# Patient Record
Sex: MALE | Race: WHITE | NOT HISPANIC OR LATINO | Employment: OTHER | URBAN - METROPOLITAN AREA
[De-identification: names, ages, dates, MRNs, and addresses within clinical notes are randomized per-mention and may not be internally consistent; named-entity substitution may affect disease eponyms.]

---

## 2017-02-20 ENCOUNTER — ALLSCRIPTS OFFICE VISIT (OUTPATIENT)
Dept: OTHER | Facility: OTHER | Age: 62
End: 2017-02-20

## 2017-02-20 DIAGNOSIS — I10 ESSENTIAL (PRIMARY) HYPERTENSION: ICD-10-CM

## 2017-02-20 DIAGNOSIS — Z11.59 ENCOUNTER FOR SCREENING FOR OTHER VIRAL DISEASES: ICD-10-CM

## 2017-02-20 DIAGNOSIS — Z13.220 ENCOUNTER FOR SCREENING FOR LIPOID DISORDERS: ICD-10-CM

## 2017-02-20 DIAGNOSIS — N40.0 ENLARGED PROSTATE WITHOUT LOWER URINARY TRACT SYMPTOMS (LUTS): ICD-10-CM

## 2017-02-20 DIAGNOSIS — R73.01 IMPAIRED FASTING GLUCOSE: ICD-10-CM

## 2017-02-20 DIAGNOSIS — E78.5 HYPERLIPIDEMIA: ICD-10-CM

## 2017-02-21 ENCOUNTER — APPOINTMENT (OUTPATIENT)
Dept: LAB | Facility: HOSPITAL | Age: 62
End: 2017-02-21
Payer: COMMERCIAL

## 2017-02-21 DIAGNOSIS — I10 ESSENTIAL (PRIMARY) HYPERTENSION: ICD-10-CM

## 2017-02-21 DIAGNOSIS — Z11.59 ENCOUNTER FOR SCREENING FOR OTHER VIRAL DISEASES: ICD-10-CM

## 2017-02-21 DIAGNOSIS — R73.01 IMPAIRED FASTING GLUCOSE: ICD-10-CM

## 2017-02-21 DIAGNOSIS — N40.0 ENLARGED PROSTATE WITHOUT LOWER URINARY TRACT SYMPTOMS (LUTS): ICD-10-CM

## 2017-02-21 DIAGNOSIS — Z13.220 ENCOUNTER FOR SCREENING FOR LIPOID DISORDERS: ICD-10-CM

## 2017-02-21 LAB
ALBUMIN SERPL BCP-MCNC: 3.8 G/DL (ref 3.5–5)
ALP SERPL-CCNC: 53 U/L (ref 46–116)
ALT SERPL W P-5'-P-CCNC: 25 U/L (ref 12–78)
ANION GAP SERPL CALCULATED.3IONS-SCNC: 6 MMOL/L (ref 4–13)
AST SERPL W P-5'-P-CCNC: 10 U/L (ref 5–45)
BILIRUB SERPL-MCNC: 0.42 MG/DL (ref 0.2–1)
BILIRUB UR QL STRIP: NEGATIVE
BUN SERPL-MCNC: 21 MG/DL (ref 5–25)
CALCIUM SERPL-MCNC: 8.7 MG/DL (ref 8.3–10.1)
CHLORIDE SERPL-SCNC: 110 MMOL/L (ref 100–108)
CHOLEST SERPL-MCNC: 182 MG/DL (ref 50–200)
CLARITY UR: CLEAR
CO2 SERPL-SCNC: 26 MMOL/L (ref 21–32)
COLOR UR: YELLOW
CREAT SERPL-MCNC: 0.88 MG/DL (ref 0.6–1.3)
ERYTHROCYTE [DISTWIDTH] IN BLOOD BY AUTOMATED COUNT: 13.2 % (ref 11.6–15.1)
EST. AVERAGE GLUCOSE BLD GHB EST-MCNC: 111 MG/DL
GFR SERPL CREATININE-BSD FRML MDRD: >60 ML/MIN/1.73SQ M
GLUCOSE SERPL-MCNC: 106 MG/DL (ref 65–140)
GLUCOSE UR STRIP-MCNC: NEGATIVE MG/DL
HBA1C MFR BLD: 5.5 % (ref 4.2–6.3)
HCT VFR BLD AUTO: 48.3 % (ref 36.5–49.3)
HDLC SERPL-MCNC: 40 MG/DL (ref 40–60)
HGB BLD-MCNC: 16.7 G/DL (ref 12–17)
HGB UR QL STRIP.AUTO: NEGATIVE
KETONES UR STRIP-MCNC: NEGATIVE MG/DL
LDLC SERPL CALC-MCNC: 123 MG/DL (ref 0–100)
LEUKOCYTE ESTERASE UR QL STRIP: NEGATIVE
MCH RBC QN AUTO: 30.4 PG (ref 26.8–34.3)
MCHC RBC AUTO-ENTMCNC: 34.6 G/DL (ref 31.4–37.4)
MCV RBC AUTO: 88 FL (ref 82–98)
NITRITE UR QL STRIP: NEGATIVE
PH UR STRIP.AUTO: 6.5 [PH] (ref 4.5–8)
PLATELET # BLD AUTO: 227 THOUSANDS/UL (ref 149–390)
PMV BLD AUTO: 9.6 FL (ref 8.9–12.7)
POTASSIUM SERPL-SCNC: 4.5 MMOL/L (ref 3.5–5.3)
PROT SERPL-MCNC: 6.6 G/DL (ref 6.4–8.2)
PROT UR STRIP-MCNC: NEGATIVE MG/DL
RBC # BLD AUTO: 5.5 MILLION/UL (ref 3.88–5.62)
SODIUM SERPL-SCNC: 142 MMOL/L (ref 136–145)
SP GR UR STRIP.AUTO: 1.01 (ref 1–1.03)
TRIGL SERPL-MCNC: 93 MG/DL
UROBILINOGEN UR QL STRIP.AUTO: 0.2 E.U./DL
WBC # BLD AUTO: 6.45 THOUSAND/UL (ref 4.31–10.16)

## 2017-02-21 PROCEDURE — 85027 COMPLETE CBC AUTOMATED: CPT

## 2017-02-21 PROCEDURE — 81003 URINALYSIS AUTO W/O SCOPE: CPT

## 2017-02-21 PROCEDURE — 83036 HEMOGLOBIN GLYCOSYLATED A1C: CPT

## 2017-02-21 PROCEDURE — 84153 ASSAY OF PSA TOTAL: CPT

## 2017-02-21 PROCEDURE — 86803 HEPATITIS C AB TEST: CPT

## 2017-02-21 PROCEDURE — 36415 COLL VENOUS BLD VENIPUNCTURE: CPT

## 2017-02-21 PROCEDURE — 84154 ASSAY OF PSA FREE: CPT

## 2017-02-21 PROCEDURE — 80061 LIPID PANEL: CPT

## 2017-02-21 PROCEDURE — 80053 COMPREHEN METABOLIC PANEL: CPT

## 2017-02-22 LAB — HCV AB SER QL: NORMAL

## 2017-02-24 LAB
PSA FREE MFR SERPL: 21.6 %
PSA FREE SERPL-MCNC: 0.41 NG/ML
PSA SERPL-MCNC: 1.9 NG/ML (ref 0–4)

## 2017-02-27 ENCOUNTER — ALLSCRIPTS OFFICE VISIT (OUTPATIENT)
Dept: OTHER | Facility: OTHER | Age: 62
End: 2017-02-27

## 2017-04-18 ENCOUNTER — ALLSCRIPTS OFFICE VISIT (OUTPATIENT)
Dept: OTHER | Facility: OTHER | Age: 62
End: 2017-04-18

## 2017-05-22 ENCOUNTER — ALLSCRIPTS OFFICE VISIT (OUTPATIENT)
Dept: OTHER | Facility: OTHER | Age: 62
End: 2017-05-22

## 2017-12-08 ENCOUNTER — ALLSCRIPTS OFFICE VISIT (OUTPATIENT)
Dept: OTHER | Facility: OTHER | Age: 62
End: 2017-12-08

## 2017-12-09 NOTE — PROGRESS NOTES
Assessment    1  Benign essential HTN (401 1) (I10)   2  Impaired fasting glucose (790 21) (R73 01)   3  Dyslipidemia (272 4) (E78 5)    Plan  Benign essential HTN    · Lisinopril 40 MG Oral Tablet; TAKE 1 TABLET DAILY  Dyslipidemia    · Simvastatin 20 MG Oral Tablet; TAKE 1 TABLET DAILY  Enlarged prostate    · Terazosin HCl - 2 MG Oral Capsule; TAKE 2 CAPSULE Bedtime  Erectile dysfunction    · Sildenafil Citrate 20 MG Oral Tablet; TAKE 1 TABLET by mouth as needed approximatley 1 hourbefore sexual activity   · Follow-up visit in 6 months Evaluation and Treatment  Follow-up  Status: Hold For - Scheduling Requested for: [id number]   · We recommend you quit smoking  Time spent counseling today was greater than 10 minutes  ;Status:Complete;   Done: 96KZA6564 11:36AM    Discussion/Summary  Discussion Summary:   See med list discussed  Counseling Documentation With Imm: The patient was counseled regarding diagnostic results,-- instructions for management,-- risk factor reductions,-- prognosis  Chief Complaint  Chief Complaint Free Text Note Form: Patient is here today for a follow up visit for hypertension  blood testing was 10/4/17  refills needed  History of Present Illness  Hypertension (Follow-Up): The patient presents for follow-up of essential hypertension  The patient states he has been doing poorly with his blood pressure control since the last visit  He has no comorbid illnesses  Symptoms: The patient is currently asymptomatic  Home monitoring: The patient checks his blood pressure sporadically  Medications: the patient is not adherent with his medication regimen  Disease Management: the patient is not doing well with his blood pressure goals  The patient is due for a serum creatinine  Review of Systems  Complete-Male:  Constitutional: No fever or chills, feels well, no tiredness, no recent weight gain or weight loss    Eyes: No complaints of eye pain, no red eyes, no discharge from eyes, no itchy eyes  ENT: no complaints of earache, no hearing loss, no nosebleeds, no nasal discharge, no sore throat, no hoarseness  Cardiovascular: No complaints of slow heart rate, no fast heart rate, no chest pain, no palpitations, no leg claudication, no lower extremity  Respiratory: No complaints of shortness of breath, no wheezing, no cough, no SOB on exertion, no orthopnea or PND  Musculoskeletal: as noted in HPI  Neurological: No compliants of headache, no confusion, no convulsions, no numbness or tingling, no dizziness or fainting, no limb weakness, no difficulty walking  Psychiatric: Is not suicidal, no sleep disturbances, no anxiety or depression, no change in personality, no emotional problems  Hematologic/Lymphatic: No complaints of swollen glands, no swollen glands in the neck, does not bleed easily, no easy bruising  Active Problems  1  Basal cell carcinoma of nose (173 31) (C44 311)   2  Benign essential HTN (401 1) (I10)   3  Cigar smoker (305 1) (F17 290)   4  Drug-induced erectile dysfunction (607 84,E980 5) (N52 2)   5  Dyslipidemia (272 4) (E78 5)   6  Enlarged prostate (600 00) (N40 0)   7  Impaired fasting glucose (790 21) (R73 01)    Past Medical History  1  History of Abnormal chest xray (793 2) (R93 8)   2  History of Acute URI (465 9) (J06 9)   3  History of Benign skin lesion of sternum (709 9) (L98 9)   4  History of Collapsed lung (518 0) (J98 19)   5  History of Cough (786 2) (R05)   6  History of actinic keratosis (V13 3) (Z87 2)   7  History of acute bronchitis (V12 69) (Z87 09)   8  History of acute sinusitis (V12 69) (Z87 09)   9  History of colonic polyps (V12 72) (Z86 010)   10  History of Left elbow pain (719 42) (M25 522)   11  History of Lesion of nose (478 19) (J34 89)   12  History of Lesion of shoulder, right (709 9) (M75 91)   13  History of Skin lesion of scalp (709 9) (L98 9)  Active Problems And Past Medical History Reviewed:    The active problems and past medical history were reviewed and updated today  Surgical History  1  History of Tonsillectomy  Surgical History Reviewed: The surgical history was reviewed and updated today  Family History  Mother    1  Family history of osteoporosis (V17 81) (Z82 62)  Father    2  No pertinent family history  Family History Reviewed: The family history was reviewed and updated today  Social History     · Cigar smoker (305 1) (F17 290)   · Current every day smoker (305 1) (D91 857)   · No illicit drug use   · Social alcohol use (Z78 9)  Social History Reviewed: The social history was reviewed and updated today  Current Meds   1  Lisinopril 40 MG Oral Tablet; TAKE 1 TABLET DAILY; Therapy: 51Kfd4193 to (Evaluate:18Nov2017)  Requested for: 35MMM2749; Last Rx:[id number] Ordered   2  Simvastatin 20 MG Oral Tablet; TAKE 1 TABLET DAILY; Therapy: 47VYT7113 to (Evaluate:18Nov2017)  Requested for: 86WMN1497; Last Rx:59Ofb3895 Ordered   3  Terazosin HCl - 2 MG Oral Capsule; TAKE 2 CAPSULE Bedtime; Therapy: 54Rwi5528 to (Evaluate:18Nov2017)  Requested for: 08HWR9417; Last Rx:44Zbn3346 Ordered   4  Zostavax 25871 UNT/0 65ML Subcutaneous Solution Reconstituted; INJECT 0 5 ML Once; Therapy: 46ZLU3208 to (Evaluate:50Mmr6461)  Requested for: 50CPJ7817; Last Rx:60Elg4748 Ordered  Medication List Reviewed: The medication list was reviewed and updated today  Allergies  1  No Known Drug Allergies  2  Seasonal    Vitals  Vital Signs    Recorded: 41TQH0833 11:13AM   Temperature 98 2 F, Tympanic   Heart Rate 80, L Radial   Pulse Quality Regular, L Radial   Respiration 18   Systolic 826, LUE, Sitting   Diastolic 88, LUE, Sitting   Height 5 ft 7 01 in   Weight 174 lb 6 4 oz   BMI Calculated 27 31   BSA Calculated 1 91   O2 Saturation 96, RA       Physical Exam   Constitutional  General appearance: No acute distress, well appearing and well nourished  Eyes  Conjunctiva and lids: No swelling, erythema, or discharge     Ears, Nose, Mouth, and Throat  Otoscopic examination: Tympanic membrance translucent with normal light reflex  Canals patent without erythema  Oropharynx: Normal with no erythema, edema, exudate or lesions  Pulmonary  Auscultation of lungs: Clear to auscultation, equal breath sounds bilaterally, no wheezes, no rales, no rhonci  Cardiovascular  Palpation of heart: Normal PMI, no thrills  Auscultation of heart: Normal rate and rhythm, normal S1 and S2, without murmurs  Carotid pulses: Normal    Abdomen  Abdomen: Non-tender, no masses  Musculoskeletal  Gait and station: Normal    Neurologic  Cranial nerves: Cranial nerves 2-12 intact  Reflexes: 2+ and symmetric  Psychiatric  Orientation to person, place and time: Normal    Mood and affect: Normal          Health Management  Depression screening   PHevery-2 Adult Depression Screening; every 1 year; Last 27Feb2017; Next Due: 46HYV1091; Active    Signatures   Electronically signed by :  Antonella Stone MD; Dec  8 2017 11:38AM EST                       (Author)

## 2018-01-13 VITALS
HEIGHT: 68 IN | TEMPERATURE: 98.3 F | DIASTOLIC BLOOD PRESSURE: 86 MMHG | BODY MASS INDEX: 27.13 KG/M2 | SYSTOLIC BLOOD PRESSURE: 142 MMHG | OXYGEN SATURATION: 98 % | HEART RATE: 76 BPM | WEIGHT: 179 LBS

## 2018-01-13 VITALS
SYSTOLIC BLOOD PRESSURE: 148 MMHG | HEART RATE: 74 BPM | OXYGEN SATURATION: 97 % | TEMPERATURE: 97.4 F | HEIGHT: 68 IN | BODY MASS INDEX: 27.74 KG/M2 | WEIGHT: 183 LBS | DIASTOLIC BLOOD PRESSURE: 92 MMHG

## 2018-01-14 VITALS
HEART RATE: 80 BPM | TEMPERATURE: 96.9 F | SYSTOLIC BLOOD PRESSURE: 146 MMHG | BODY MASS INDEX: 27 KG/M2 | OXYGEN SATURATION: 98 % | WEIGHT: 178.13 LBS | DIASTOLIC BLOOD PRESSURE: 84 MMHG | HEIGHT: 68 IN

## 2018-01-15 VITALS
SYSTOLIC BLOOD PRESSURE: 126 MMHG | TEMPERATURE: 98.3 F | BODY MASS INDEX: 27.05 KG/M2 | WEIGHT: 178.5 LBS | DIASTOLIC BLOOD PRESSURE: 76 MMHG | HEIGHT: 68 IN | OXYGEN SATURATION: 97 % | HEART RATE: 72 BPM

## 2018-01-22 VITALS
RESPIRATION RATE: 18 BRPM | SYSTOLIC BLOOD PRESSURE: 138 MMHG | TEMPERATURE: 98.2 F | HEART RATE: 80 BPM | HEIGHT: 67 IN | OXYGEN SATURATION: 96 % | DIASTOLIC BLOOD PRESSURE: 88 MMHG | WEIGHT: 174.4 LBS | BODY MASS INDEX: 27.37 KG/M2

## 2018-06-06 DIAGNOSIS — I10 ESSENTIAL HYPERTENSION, BENIGN: Primary | ICD-10-CM

## 2018-06-06 RX ORDER — LISINOPRIL 40 MG/1
1 TABLET ORAL DAILY
COMMUNITY
Start: 2011-08-03 | End: 2018-06-06 | Stop reason: SDUPTHER

## 2018-06-06 RX ORDER — LISINOPRIL 40 MG/1
40 TABLET ORAL DAILY
Qty: 90 TABLET | Refills: 0 | Status: SHIPPED | OUTPATIENT
Start: 2018-06-06 | End: 2018-10-08 | Stop reason: SDUPTHER

## 2018-06-06 NOTE — TELEPHONE ENCOUNTER
Called patient and advised him that his prescriptions were ready and that he needed to schedule a follow up visit  I asked if he wanted to make that appt now and he stated that he would call back before his next refill

## 2018-06-06 NOTE — TELEPHONE ENCOUNTER
Last appt, 12/8/17    Next appt, none pending      No lab orders in chart, but pt is due for 4M f/u  Will call and have him schedule

## 2018-06-06 NOTE — TELEPHONE ENCOUNTER
Med filled for 90 days  Please call and have pt schedule f/u appt in the next 90 days in order to continue getting med refills  He's overdue for his 4M f/u  Thank you!

## 2018-09-03 DIAGNOSIS — I10 ESSENTIAL HYPERTENSION, BENIGN: ICD-10-CM

## 2018-09-04 RX ORDER — LISINOPRIL 40 MG/1
TABLET ORAL
Qty: 90 TABLET | Refills: 0 | OUTPATIENT
Start: 2018-09-04

## 2018-09-06 ENCOUNTER — TELEPHONE (OUTPATIENT)
Dept: INTERNAL MEDICINE CLINIC | Facility: CLINIC | Age: 63
End: 2018-09-06

## 2018-09-06 NOTE — TELEPHONE ENCOUNTER
Does not have insurance and need refills but would like to have labs done ahead of visit so they don t need two separate openings  # [phone]

## 2018-09-06 NOTE — TELEPHONE ENCOUNTER
Recommend [facility] test now - paper orders  They have a "basic check up" panel that is $110 - CBC, CMP, lipid, TSH and UA  Please check which office pt would like to get order from and provider in that office may complete paper order

## 2018-10-08 ENCOUNTER — OFFICE VISIT (OUTPATIENT)
Dept: INTERNAL MEDICINE CLINIC | Facility: CLINIC | Age: 63
End: 2018-10-08
Payer: COMMERCIAL

## 2018-10-08 VITALS
BODY MASS INDEX: 25.74 KG/M2 | HEIGHT: 69 IN | SYSTOLIC BLOOD PRESSURE: 130 MMHG | TEMPERATURE: 98 F | OXYGEN SATURATION: 97 % | DIASTOLIC BLOOD PRESSURE: 90 MMHG | WEIGHT: 173.8 LBS | HEART RATE: 68 BPM

## 2018-10-08 DIAGNOSIS — E78.5 DYSLIPIDEMIA: ICD-10-CM

## 2018-10-08 DIAGNOSIS — N40.0 ENLARGED PROSTATE: Primary | ICD-10-CM

## 2018-10-08 DIAGNOSIS — Z72.0 TOBACCO ABUSE: ICD-10-CM

## 2018-10-08 DIAGNOSIS — I10 BENIGN ESSENTIAL HTN: ICD-10-CM

## 2018-10-08 DIAGNOSIS — I10 ESSENTIAL HYPERTENSION, BENIGN: ICD-10-CM

## 2018-10-08 PROBLEM — N52.2 DRUG-INDUCED ERECTILE DYSFUNCTION: Status: ACTIVE | Noted: 2017-05-22

## 2018-10-08 PROBLEM — N52.9 ERECTILE DYSFUNCTION: Status: ACTIVE | Noted: 2017-12-08

## 2018-10-08 PROBLEM — R73.01 IMPAIRED FASTING GLUCOSE: Status: ACTIVE | Noted: 2017-02-20

## 2018-10-08 PROBLEM — C44.311 BASAL CELL CARCINOMA OF NOSE: Status: ACTIVE | Noted: 2017-05-22

## 2018-10-08 PROCEDURE — 99213 OFFICE O/P EST LOW 20 MIN: CPT | Performed by: NURSE PRACTITIONER

## 2018-10-08 RX ORDER — LISINOPRIL 40 MG/1
40 TABLET ORAL DAILY
Qty: 90 TABLET | Refills: 1 | Status: SHIPPED | OUTPATIENT
Start: 2018-10-08 | End: 2019-05-10 | Stop reason: SDUPTHER

## 2018-10-08 RX ORDER — TERAZOSIN 2 MG/1
2 CAPSULE ORAL DAILY
COMMUNITY
Start: 2011-08-03 | End: 2018-10-08 | Stop reason: SDUPTHER

## 2018-10-08 RX ORDER — TERAZOSIN 2 MG/1
4 CAPSULE ORAL
Qty: 180 CAPSULE | Refills: 1 | Status: SHIPPED | OUTPATIENT
Start: 2018-10-08 | End: 2019-05-10 | Stop reason: SDUPTHER

## 2018-10-08 RX ORDER — SIMVASTATIN 20 MG
1 TABLET ORAL DAILY
COMMUNITY
Start: 2017-02-27 | End: 2018-10-08 | Stop reason: SDUPTHER

## 2018-10-08 RX ORDER — SIMVASTATIN 20 MG
20 TABLET ORAL DAILY
Qty: 30 TABLET | Refills: 0 | Status: SHIPPED | OUTPATIENT
Start: 2018-10-08 | End: 2020-02-10 | Stop reason: SDUPTHER

## 2018-10-08 NOTE — PATIENT INSTRUCTIONS
Continue current medications  No changes  Will update lab slips with anywhere lab now  Patient is due to get influenza at [facility]  Patient given order for CT lung screening    You need to stop smoking!! There are many ways to quit smoking  You can start with cutting back a few cigarettes a day and slowly wean yourself off  You can try OTC nicotine patches, gum or lozenges  As discussed there are also some prescription medications you may consider as well    Smoking increases your risk of heart attack and stroke - you need to quit!!

## 2018-10-08 NOTE — PROGRESS NOTES
Assessment/Plan:    Hypertension:  Controlled  Continue lisinopril 40mg daily  Continue to monitor blood pressure at home  Goal BP is < 140/90  Contact our office for consistent elevations  Recommend low sodium diet  Exercise 30 minutes three times a week as tolerated  Recommend yearly eye exam     Hyperlipidemia:   Patient is currently taking his simvastatin a few times a week  He is due for lipid panel  Will update lipids  Likely patient will need to take on a daily basis given increased risk as he is a current smoker  Recommend healthy lifestyle choices for your cholesterol  Low fat/low cholesterol diet  Limit/avoid red meat  Eat more lean meat - chicken breast, ground turkey, fish  Exercise 30 mins at least 3 times a week as tolerated  Enlarged prostate:  Patient is currently taking terazosin  symptoms stable if pt takes medication  Due for PSA  Tobacco abuse: Patient was previously a three pack per day smoker  He currently smokes 10 cigars a day  Will order CT screening lung cancer  Patient is due for labs  He is given lab slip for any lab now for CBC, CMP, lipid panel, TSH, UA and a PSA  Patient to get his influenza vaccine at [facility] as it is free  D/W pt smoking cessation  The patient is currently cash pay will attempt to see patient on a yearly basis  Discussed with patient the importance of patient to follow up sooner if his blood pressure is elevated, concerning symptoms or if there is abnormalities on his labs  Diagnoses and all orders for this visit:    Enlarged prostate  -     terazosin (HYTRIN) 2 mg capsule; Take 2 capsules (4 mg total) by mouth daily at bedtime    Essential hypertension, benign  -     lisinopril (ZESTRIL) 40 mg tablet; Take 1 tablet (40 mg total) by mouth daily    Tobacco abuse  -     CT lung screening program; Future    Dyslipidemia  -     simvastatin (ZOCOR) 20 mg tablet;  Take 1 tablet (20 mg total) by mouth daily    Benign essential HTN    Other orders  -     Discontinue: simvastatin (ZOCOR) 20 mg tablet; Take 1 tablet by mouth daily  -     Discontinue: terazosin (HYTRIN) 2 mg capsule; Take 2 capsules by mouth daily        Subjective:      Patient ID: Kim Rogers is a 65 y o  male  HPI    Patient presents for a routine follow-up and medication refill  Patient is overall doing well with no concerns  Hyperlipidemia:  Pt is here for follow-up hyperlipidemia  Pt is doing okay with no concerns  Pt is currently taking   His simvastatin for a few times a week     Tolerating well  Side effects of medication include none  The pt does not exercise on a routine basis  Last lipid panel was due for  Hypertension:    Pt is here to follow-up on hypertension  Co-morbidities include HLD, tobacco abuse, sedentary job -   Associated symptoms include none  Denies blurred vision, Lane, CP/SOB, LE edema  Pt is currently taking lisinopril and tolerating well with no side effects  Pt does checks BP at home and gets readings 130/80-90  Pt does not exercise on routine basis  Last eye exam 2-3 months ago  Last labs due for  Enlarged Prostate  Patient is currently taking terazosin for enlarged prostate  He states that he has no urinary difficulties if he takes medication  However he misses a few doses he will notice he has difficulties urinating  He is due for PSA    Tobacco abuse  Patient was previously a three pack per day smoker  Currently smokes approximately 10 cigars a day    The following portions of the patient's history were reviewed and updated as appropriate: allergies, current medications, past family history, past medical history, past social history, past surgical history and problem list     Review of Systems   Constitutional: Negative for chills, fatigue and fever  HENT: Negative for congestion, postnasal drip, sinus pain, sinus pressure and sore throat  Eyes: Negative for visual disturbance  Respiratory: Negative for cough and shortness of breath  Cardiovascular: Negative for chest pain, palpitations and leg swelling  Gastrointestinal: Negative for constipation, diarrhea, nausea and vomiting  Genitourinary: Positive for difficulty urinating  Negative for dysuria, hematuria and urgency  Musculoskeletal: Positive for back pain  Negative for arthralgias and gait problem  Skin: Negative for rash  Neurological: Negative for dizziness, syncope, light-headedness and headaches  Psychiatric/Behavioral: Negative for dysphoric mood and sleep disturbance  The patient is not nervous/anxious  Past Medical History:   Diagnosis Date    Hypertension          Current Outpatient Prescriptions:     lisinopril (ZESTRIL) 40 mg tablet, Take 1 tablet (40 mg total) by mouth daily, Disp: 90 tablet, Rfl: 0    simvastatin (ZOCOR) 20 mg tablet, Take 1 tablet by mouth daily, Disp: , Rfl:     terazosin (HYTRIN) 2 mg capsule, Take 2 capsules by mouth daily, Disp: , Rfl:     No Known Allergies    Social History   Past Surgical History:   Procedure Laterality Date    COLON SURGERY      TONSILLECTOMY      WISDOM TOOTH EXTRACTION       Family History   Problem Relation Age of Onset    Hyperlipidemia Mother     No Known Problems Father        Objective:  /90 (BP Location: Left arm, Patient Position: Sitting, Cuff Size: Adult)   Pulse 68   Temp 98 °F (36 7 °C) (Oral)   Ht 5' 9" (1 753 m)   Wt 78 8 kg (173 lb 12 8 oz)   SpO2 97%   BMI 25 67 kg/m²      Physical Exam   Constitutional: He is oriented to person, place, and time  He appears well-developed and well-nourished  No distress  HENT:   Head: Normocephalic and atraumatic     Right Ear: Hearing, tympanic membrane, external ear and ear canal normal    Left Ear: Hearing, tympanic membrane, external ear and ear canal normal    Nose: Nose normal    Mouth/Throat: Uvula is midline, oropharynx is clear and moist and mucous membranes are normal  Neck: Neck supple  No thyromegaly present  Cardiovascular: Normal rate, regular rhythm and normal heart sounds  No murmur heard  No pedal edema   Pulmonary/Chest: Effort normal and breath sounds normal  No respiratory distress  He has no wheezes  Lymphadenopathy:     He has no cervical adenopathy  Neurological: He is alert and oriented to person, place, and time  No focal deficits   Skin: Skin is warm and dry  Psychiatric: He has a normal mood and affect  His behavior is normal  Judgment and thought content normal    Nursing note and vitals reviewed

## 2019-01-05 DIAGNOSIS — E78.5 DYSLIPIDEMIA: ICD-10-CM

## 2019-01-07 RX ORDER — SIMVASTATIN 20 MG
TABLET ORAL
Qty: 30 TABLET | Refills: 0 | OUTPATIENT
Start: 2019-01-07

## 2019-04-03 DIAGNOSIS — I10 ESSENTIAL HYPERTENSION, BENIGN: ICD-10-CM

## 2019-04-03 DIAGNOSIS — N40.0 ENLARGED PROSTATE: ICD-10-CM

## 2019-04-03 RX ORDER — TERAZOSIN 2 MG/1
4 CAPSULE ORAL
Qty: 180 CAPSULE | Refills: 0 | OUTPATIENT
Start: 2019-04-03

## 2019-04-03 RX ORDER — LISINOPRIL 40 MG/1
TABLET ORAL
Qty: 90 TABLET | Refills: 0 | OUTPATIENT
Start: 2019-04-03

## 2019-05-10 ENCOUNTER — OFFICE VISIT (OUTPATIENT)
Dept: INTERNAL MEDICINE CLINIC | Age: 64
End: 2019-05-10

## 2019-05-10 VITALS
OXYGEN SATURATION: 98 % | WEIGHT: 181 LBS | DIASTOLIC BLOOD PRESSURE: 82 MMHG | BODY MASS INDEX: 26.73 KG/M2 | HEART RATE: 72 BPM | TEMPERATURE: 97.6 F | SYSTOLIC BLOOD PRESSURE: 146 MMHG

## 2019-05-10 DIAGNOSIS — R73.9 HYPERGLYCEMIA: ICD-10-CM

## 2019-05-10 DIAGNOSIS — N40.0 ENLARGED PROSTATE: ICD-10-CM

## 2019-05-10 DIAGNOSIS — I10 ESSENTIAL HYPERTENSION, BENIGN: ICD-10-CM

## 2019-05-10 DIAGNOSIS — R79.89 ABNORMAL TSH: ICD-10-CM

## 2019-05-10 DIAGNOSIS — Z72.0 TOBACCO ABUSE: ICD-10-CM

## 2019-05-10 DIAGNOSIS — E78.5 DYSLIPIDEMIA: ICD-10-CM

## 2019-05-10 DIAGNOSIS — I10 BENIGN ESSENTIAL HTN: Primary | ICD-10-CM

## 2019-05-10 PROCEDURE — 99213 OFFICE O/P EST LOW 20 MIN: CPT | Performed by: INTERNAL MEDICINE

## 2019-05-10 RX ORDER — TERAZOSIN 2 MG/1
4 CAPSULE ORAL
Qty: 180 CAPSULE | Refills: 1 | Status: SHIPPED | OUTPATIENT
Start: 2019-05-10 | End: 2020-02-10 | Stop reason: SDUPTHER

## 2019-05-10 RX ORDER — LISINOPRIL 40 MG/1
40 TABLET ORAL DAILY
Qty: 90 TABLET | Refills: 1 | Status: SHIPPED | OUTPATIENT
Start: 2019-05-10 | End: 2019-11-04 | Stop reason: SDUPTHER

## 2019-11-01 DIAGNOSIS — I10 ESSENTIAL HYPERTENSION, BENIGN: ICD-10-CM

## 2019-11-01 RX ORDER — LISINOPRIL 40 MG/1
TABLET ORAL
Qty: 90 TABLET | Refills: 1 | OUTPATIENT
Start: 2019-11-01

## 2019-11-04 DIAGNOSIS — I10 ESSENTIAL HYPERTENSION, BENIGN: ICD-10-CM

## 2019-11-04 RX ORDER — LISINOPRIL 40 MG/1
40 TABLET ORAL DAILY
Qty: 90 TABLET | Refills: 0 | Status: SHIPPED | OUTPATIENT
Start: 2019-11-04 | End: 2020-02-10 | Stop reason: SDUPTHER

## 2019-11-05 NOTE — TELEPHONE ENCOUNTER
Spoke with patients wife, she stated he is a  and it is hard for him to get to the doctor  She will call back and reschedule an appointment  I told her that he needs to be seen before his medications run out because they may not be filled again without being seen for a follow up appointment  She understood and said she will get him in here to be seen

## 2019-12-26 ENCOUNTER — OFFICE VISIT (OUTPATIENT)
Dept: INTERNAL MEDICINE CLINIC | Age: 64
End: 2019-12-26

## 2019-12-26 VITALS
DIASTOLIC BLOOD PRESSURE: 80 MMHG | OXYGEN SATURATION: 97 % | TEMPERATURE: 97.5 F | WEIGHT: 181 LBS | SYSTOLIC BLOOD PRESSURE: 136 MMHG | HEIGHT: 69 IN | HEART RATE: 70 BPM | BODY MASS INDEX: 26.81 KG/M2

## 2019-12-26 DIAGNOSIS — L30.9 DERMATITIS DUE TO UNKNOWN CAUSE: ICD-10-CM

## 2019-12-26 DIAGNOSIS — I10 BENIGN ESSENTIAL HTN: Primary | ICD-10-CM

## 2019-12-26 DIAGNOSIS — Z12.5 ENCOUNTER FOR PROSTATE CANCER SCREENING: ICD-10-CM

## 2019-12-26 DIAGNOSIS — Z12.11 SCREENING FOR MALIGNANT NEOPLASM OF COLON: ICD-10-CM

## 2019-12-26 DIAGNOSIS — R73.9 HYPERGLYCEMIA: ICD-10-CM

## 2019-12-26 PROCEDURE — 99212 OFFICE O/P EST SF 10 MIN: CPT | Performed by: PHYSICIAN ASSISTANT

## 2019-12-26 RX ORDER — TRIAMCINOLONE ACETONIDE 1 MG/G
CREAM TOPICAL 2 TIMES DAILY
Qty: 30 G | Refills: 0 | Status: SHIPPED | OUTPATIENT
Start: 2019-12-26 | End: 2020-09-15 | Stop reason: ALTCHOICE

## 2019-12-26 NOTE — PROGRESS NOTES
Assessment/Plan:         Diagnoses and all orders for this visit:    Benign essential HTN  Comments:  controlled, may monitor at home, call if over 140/90  eliminate salt as much as possible  decrease caffiene intake    Orders:  -     Comprehensive metabolic panel; Future    Screening for malignant neoplasm of colon  -     Ambulatory referral to Gastroenterology; Future    Dermatitis due to unknown cause  Comments:  healing, will order triamcinolone to use until healed   Orders:  -     triamcinolone (KENALOG) 0 1 % cream; Apply topically 2 (two) times a day  -     Comprehensive metabolic panel; Future    Encounter for prostate cancer screening  -     PSA, Total Screen; Future    Hyperglycemia  Comments:  check hga1c - pt agrees to go to [facility] labs   Orders:  -     Comprehensive metabolic panel; Future  -     HEMOGLOBIN A1C W/ EAG ESTIMATION; Future          Subjective:      Patient ID: Jennifer Steinberg is a 62 y o  male  63 y/o here for f/u for HTN  Pt states he ate ham last night and lots of salty foods and drank more caffiene than usual and bp at home was 190/100  Today it is improved - he usually drinks 50/50 caff coffee but didn't yesterday         BMI Counseling: Body mass index is 26 96 kg/m²  The BMI is above normal  Nutrition recommendations include decreasing portion sizes, decreasing fast food intake and consuming healthier snacks  Exercise recommendations include exercising 3-5 times per week  The following portions of the patient's history were reviewed and updated as appropriate: allergies, current medications, past family history, past medical history, past social history, past surgical history and problem list     Review of Systems   Constitutional: Negative for appetite change, chills, fatigue and fever  HENT: Negative for congestion, hearing loss and postnasal drip  Eyes: Negative for itching and visual disturbance     Respiratory: Negative for cough, shortness of breath and wheezing  Cardiovascular: Negative for chest pain, palpitations and leg swelling  Gastrointestinal: Negative for abdominal pain, constipation, diarrhea and nausea  Genitourinary: Negative for dysuria, hematuria and urgency  Musculoskeletal: Positive for back pain  Negative for arthralgias, gait problem and myalgias  Skin: Positive for rash  Allergic/Immunologic: Negative for environmental allergies  Neurological: Negative for dizziness, speech difficulty, light-headedness and headaches  Hematological: Does not bruise/bleed easily  Psychiatric/Behavioral: Negative for sleep disturbance  The patient is not nervous/anxious  Past Medical History:   Diagnosis Date    Basal cell carcinoma of nose 5/22/2017    Hypertension          Current Outpatient Medications:     lisinopril (ZESTRIL) 40 mg tablet, Take 1 tablet (40 mg total) by mouth daily, Disp: 90 tablet, Rfl: 0    simvastatin (ZOCOR) 20 mg tablet, Take 1 tablet (20 mg total) by mouth daily, Disp: 30 tablet, Rfl: 0    terazosin (HYTRIN) 2 mg capsule, Take 2 capsules (4 mg total) by mouth daily at bedtime, Disp: 180 capsule, Rfl: 1    triamcinolone (KENALOG) 0 1 % cream, Apply topically 2 (two) times a day, Disp: 30 g, Rfl: 0    No Known Allergies    Social History   Past Surgical History:   Procedure Laterality Date    COLON SURGERY      TONSILLECTOMY      WISDOM TOOTH EXTRACTION       Family History   Problem Relation Age of Onset    Hyperlipidemia Mother     No Known Problems Father        Objective:  /74 (BP Location: Left arm, Patient Position: Sitting, Cuff Size: Standard)   Pulse 70   Temp 97 5 °F (36 4 °C) (Tympanic)   Ht 5' 8 7" (1 745 m) Comment: boots on  Wt 82 1 kg (181 lb) Comment: boots  on  SpO2 97%   BMI 26 96 kg/m²        Physical Exam   Constitutional: He is oriented to person, place, and time  He appears well-developed and well-nourished  No distress  HENT:   Head: Normocephalic and atraumatic  Right Ear: External ear normal    Left Ear: External ear normal    Nose: Nose normal    Mouth/Throat: Oropharynx is clear and moist    Eyes: Pupils are equal, round, and reactive to light  Conjunctivae and EOM are normal    Neck: Normal range of motion  Cardiovascular: Normal rate, regular rhythm and normal heart sounds  No murmur heard  Pulmonary/Chest: Effort normal and breath sounds normal  No respiratory distress  He has no wheezes  He has no rales  Musculoskeletal: Normal range of motion  He exhibits no edema or deformity  Lymphadenopathy:     He has no cervical adenopathy  Neurological: He is alert and oriented to person, place, and time  No cranial nerve deficit  Coordination normal    Skin: Skin is warm and dry  Rash (L lateral knee - slightly discolored darker pigmented scaling skin - no erythema or cellulitis, no varicosities ) noted  He is not diaphoretic  Psychiatric: He has a normal mood and affect  His behavior is normal    Vitals reviewed

## 2019-12-31 LAB — HBA1C MFR BLD HPLC: 5.4 %

## 2020-02-10 ENCOUNTER — OFFICE VISIT (OUTPATIENT)
Dept: INTERNAL MEDICINE CLINIC | Age: 65
End: 2020-02-10

## 2020-02-10 VITALS
WEIGHT: 178 LBS | HEIGHT: 69 IN | OXYGEN SATURATION: 96 % | HEART RATE: 69 BPM | SYSTOLIC BLOOD PRESSURE: 124 MMHG | DIASTOLIC BLOOD PRESSURE: 60 MMHG | TEMPERATURE: 98.6 F | BODY MASS INDEX: 26.36 KG/M2

## 2020-02-10 DIAGNOSIS — E78.5 DYSLIPIDEMIA: ICD-10-CM

## 2020-02-10 DIAGNOSIS — N40.0 ENLARGED PROSTATE: ICD-10-CM

## 2020-02-10 DIAGNOSIS — I10 ESSENTIAL HYPERTENSION, BENIGN: ICD-10-CM

## 2020-02-10 PROCEDURE — 99214 OFFICE O/P EST MOD 30 MIN: CPT | Performed by: INTERNAL MEDICINE

## 2020-02-10 PROCEDURE — 3074F SYST BP LT 130 MM HG: CPT | Performed by: INTERNAL MEDICINE

## 2020-02-10 PROCEDURE — 3078F DIAST BP <80 MM HG: CPT | Performed by: INTERNAL MEDICINE

## 2020-02-10 PROCEDURE — 3008F BODY MASS INDEX DOCD: CPT | Performed by: INTERNAL MEDICINE

## 2020-02-10 RX ORDER — LISINOPRIL 40 MG/1
40 TABLET ORAL DAILY
Qty: 90 TABLET | Refills: 1 | Status: SHIPPED | OUTPATIENT
Start: 2020-02-10 | End: 2020-09-15 | Stop reason: SDUPTHER

## 2020-02-10 RX ORDER — TERAZOSIN 2 MG/1
4 CAPSULE ORAL
Qty: 180 CAPSULE | Refills: 1 | Status: SHIPPED | OUTPATIENT
Start: 2020-02-10 | End: 2020-09-15 | Stop reason: SDUPTHER

## 2020-02-10 RX ORDER — SIMVASTATIN 20 MG
20 TABLET ORAL DAILY
Qty: 90 TABLET | Refills: 1 | Status: SHIPPED | OUTPATIENT
Start: 2020-02-10 | End: 2020-09-15 | Stop reason: SDUPTHER

## 2020-02-10 NOTE — PROGRESS NOTES
Assessment/Plan:    Essential hypertension  - very well controlled  -will refill lisinopril  -continue with a low-salt diet    Dyslipidemia  - last lipid panel done on February 21st, 2017 showed a total cholesterol of 182, a triglyceride of 93, HDL of 40 and LDL of 123  - patient was counseled to try to eat a heart healthy diet  -will recheck his lipid panel  - will refill simvastatin  -follow-up in 6 months    BPH  - well controlled  Last PSA done 2 months ago was normal     - We will refill terazosin  Diagnoses and all orders for this visit:    Essential hypertension, benign  -     lisinopril (ZESTRIL) 40 mg tablet; Take 1 tablet (40 mg total) by mouth daily    Dyslipidemia  -     simvastatin (ZOCOR) 20 mg tablet; Take 1 tablet (20 mg total) by mouth daily  -     Lipid panel; Future    Enlarged prostate  -     terazosin (HYTRIN) 2 mg capsule; Take 2 capsules (4 mg total) by mouth daily at bedtime          Subjective:      Patient ID: Fara Dc is a 62 y o  male  HPI  Patient presents for a follow-up visit regarding his hypertension, dyslipidemia and BPH  He had blood work done 2 months ago and wants to review them  He states that he does not eat a heart healthy diet and still smokes cigars  He has a family history of CVA in his mother in her [de-identified]  Also drinks a lot of coffee  He denies chest pain, shortness of breath, palpitations, nausea, vomiting abdominal pain, diarrhea, constipation, headache, neck pain, dizziness  He wants a refill of his medications  The following portions of the patient's history were reviewed and updated as appropriate:   He  has a past medical history of Basal cell carcinoma of nose (5/22/2017) and Hypertension    He   Patient Active Problem List    Diagnosis Date Noted    Tobacco abuse 10/08/2018    Erectile dysfunction 12/08/2017    Drug-induced erectile dysfunction 05/22/2017    Basal cell carcinoma of nose 05/22/2017    Dyslipidemia 02/27/2017    Impaired fasting glucose 02/20/2017    Benign essential HTN 03/18/2016    Enlarged prostate 10/16/2014     He  has a past surgical history that includes [place] tooth extraction; Tonsillectomy; and Colon surgery  His family history includes Hyperlipidemia in his mother; No Known Problems in his father  He  reports that he has been smoking  He has never used smokeless tobacco  He reports that he drinks alcohol  He reports that he does not use drugs  Current Outpatient Medications   Medication Sig Dispense Refill    lisinopril (ZESTRIL) 40 mg tablet Take 1 tablet (40 mg total) by mouth daily 90 tablet 1    simvastatin (ZOCOR) 20 mg tablet Take 1 tablet (20 mg total) by mouth daily 90 tablet 1    terazosin (HYTRIN) 2 mg capsule Take 2 capsules (4 mg total) by mouth daily at bedtime 180 capsule 1    triamcinolone (KENALOG) 0 1 % cream Apply topically 2 (two) times a day (Patient not taking: Reported on 2/10/2020) 30 g 0     No current facility-administered medications for this visit  Current Outpatient Medications on File Prior to Visit   Medication Sig    [DISCONTINUED] lisinopril (ZESTRIL) 40 mg tablet Take 1 tablet (40 mg total) by mouth daily    [DISCONTINUED] simvastatin (ZOCOR) 20 mg tablet Take 1 tablet (20 mg total) by mouth daily    [DISCONTINUED] terazosin (HYTRIN) 2 mg capsule Take 2 capsules (4 mg total) by mouth daily at bedtime    triamcinolone (KENALOG) 0 1 % cream Apply topically 2 (two) times a day (Patient not taking: Reported on 2/10/2020)     No current facility-administered medications on file prior to visit  He has No Known Allergies       Review of Systems   Constitutional: Negative for activity change, chills, fatigue, fever and unexpected weight change  HENT: Negative for ear pain, postnasal drip, rhinorrhea, sinus pressure and sore throat  Eyes: Negative for pain  Respiratory: Negative for cough, choking, chest tightness, shortness of breath and wheezing      Cardiovascular: Negative for chest pain, palpitations and leg swelling  Gastrointestinal: Negative for abdominal pain, constipation, diarrhea, nausea and vomiting  Genitourinary: Negative for dysuria and hematuria  Musculoskeletal: Negative for arthralgias, back pain, gait problem, joint swelling, myalgias and neck stiffness  Skin: Negative for pallor and rash  Neurological: Negative for dizziness, tremors, seizures, syncope, light-headedness and headaches  Hematological: Negative for adenopathy  Psychiatric/Behavioral: Negative for behavioral problems  Objective:      /60 (BP Location: Left arm, Patient Position: Sitting, Cuff Size: Standard)   Pulse 69   Temp 98 6 °F (37 °C) (Tympanic)   Ht 5' 8 7" (1 745 m) Comment: boots on  Wt 80 7 kg (178 lb) Comment: boots on  SpO2 96%   BMI 26 52 kg/m²          Physical Exam   Constitutional: He is oriented to person, place, and time  He appears well-developed and well-nourished  No distress  HENT:   Head: Normocephalic and atraumatic  Right Ear: External ear normal    Left Ear: External ear normal    Nose: Nose normal    Mouth/Throat: Oropharynx is clear and moist  Mucous membranes are dry (Dry mucous membranes)  No oropharyngeal exudate  Eyes: Pupils are equal, round, and reactive to light  Conjunctivae and EOM are normal  Right eye exhibits no discharge  Left eye exhibits no discharge  No scleral icterus  Neck: Normal range of motion  Neck supple  No JVD present  No tracheal deviation present  No thyromegaly present  Cardiovascular: Normal rate, regular rhythm, normal heart sounds and intact distal pulses  Exam reveals no gallop and no friction rub  No murmur heard  Pulmonary/Chest: Effort normal and breath sounds normal  No respiratory distress  He has no wheezes  He has no rales  He exhibits no tenderness  Abdominal: Soft  Bowel sounds are normal  He exhibits no distension and no mass  There is no tenderness   There is no rebound and no guarding  Musculoskeletal: Normal range of motion  He exhibits no edema, tenderness or deformity  Lymphadenopathy:     He has no cervical adenopathy  Neurological: He is alert and oriented to person, place, and time  He has normal reflexes  No cranial nerve deficit  He exhibits normal muscle tone  Coordination normal    Skin: Skin is warm and dry  No rash noted  He is not diaphoretic  No erythema  No pallor  Psychiatric: He has a normal mood and affect   His behavior is normal          Orders Only on 12/31/2019   Component Date Value Ref Range Status    Hemoglobin A1C 12/31/2019 5 4   Final

## 2020-02-10 NOTE — PATIENT INSTRUCTIONS

## 2020-07-26 DIAGNOSIS — I10 ESSENTIAL HYPERTENSION, BENIGN: ICD-10-CM

## 2020-07-27 RX ORDER — LISINOPRIL 40 MG/1
TABLET ORAL
Qty: 90 TABLET | Refills: 1 | OUTPATIENT
Start: 2020-07-27

## 2020-08-08 DIAGNOSIS — E78.5 DYSLIPIDEMIA: ICD-10-CM

## 2020-08-08 DIAGNOSIS — N40.0 ENLARGED PROSTATE: ICD-10-CM

## 2020-08-10 RX ORDER — TERAZOSIN 2 MG/1
4 CAPSULE ORAL
Qty: 180 CAPSULE | Refills: 1 | OUTPATIENT
Start: 2020-08-10

## 2020-08-10 RX ORDER — SIMVASTATIN 20 MG
TABLET ORAL
Qty: 90 TABLET | Refills: 1 | OUTPATIENT
Start: 2020-08-10

## 2020-09-15 ENCOUNTER — OFFICE VISIT (OUTPATIENT)
Dept: INTERNAL MEDICINE CLINIC | Age: 65
End: 2020-09-15

## 2020-09-15 VITALS
HEIGHT: 69 IN | OXYGEN SATURATION: 98 % | BODY MASS INDEX: 25.33 KG/M2 | TEMPERATURE: 99 F | DIASTOLIC BLOOD PRESSURE: 64 MMHG | HEART RATE: 80 BPM | WEIGHT: 171 LBS | SYSTOLIC BLOOD PRESSURE: 116 MMHG

## 2020-09-15 DIAGNOSIS — I10 ESSENTIAL HYPERTENSION, BENIGN: Primary | ICD-10-CM

## 2020-09-15 DIAGNOSIS — I10 BENIGN ESSENTIAL HTN: ICD-10-CM

## 2020-09-15 DIAGNOSIS — N40.0 ENLARGED PROSTATE: ICD-10-CM

## 2020-09-15 DIAGNOSIS — Z72.0 TOBACCO ABUSE: ICD-10-CM

## 2020-09-15 DIAGNOSIS — E78.5 DYSLIPIDEMIA: ICD-10-CM

## 2020-09-15 DIAGNOSIS — R73.01 IMPAIRED FASTING GLUCOSE: ICD-10-CM

## 2020-09-15 PROCEDURE — 99213 OFFICE O/P EST LOW 20 MIN: CPT | Performed by: NURSE PRACTITIONER

## 2020-09-15 RX ORDER — SIMVASTATIN 20 MG
20 TABLET ORAL DAILY
Qty: 90 TABLET | Refills: 1 | Status: SHIPPED | OUTPATIENT
Start: 2020-09-15

## 2020-09-15 RX ORDER — LISINOPRIL 40 MG/1
40 TABLET ORAL DAILY
Qty: 90 TABLET | Refills: 1 | Status: SHIPPED | OUTPATIENT
Start: 2020-09-15

## 2020-09-15 RX ORDER — TERAZOSIN 2 MG/1
4 CAPSULE ORAL
Qty: 180 CAPSULE | Refills: 1 | Status: SHIPPED | OUTPATIENT
Start: 2020-09-15

## 2020-09-15 RX ORDER — MULTIVITAMIN
1 CAPSULE ORAL DAILY
COMMUNITY

## 2020-09-15 RX ORDER — ASPIRIN 81 MG/1
81 TABLET ORAL DAILY
COMMUNITY

## 2020-09-15 NOTE — PROGRESS NOTES
Assessment/Plan:    Problem List Items Addressed This Visit        Endocrine    Impaired fasting glucose     Improved diet  Monitor hemoglobin A1c - repeat prior to follow-up  Monitor fasting blood sugar with routine labs            Cardiovascular and Mediastinum    Benign essential HTN     Controlled on lisinopril 40 mg         Relevant Medications    lisinopril (ZESTRIL) 40 mg tablet    terazosin (HYTRIN) 2 mg capsule       Other    Enlarged prostate     Check PSA prior to follow-up  Continue terazosin         Relevant Medications    terazosin (HYTRIN) 2 mg capsule    Dyslipidemia     Update lipid panel  Continue simvastatin 20 mg         Relevant Medications    simvastatin (ZOCOR) 20 mg tablet    Tobacco abuse     Smoking cigars  Encouraged to quit smoking           Other Visit Diagnoses     Essential hypertension, benign    -  Primary    Relevant Medications    lisinopril (ZESTRIL) 40 mg tablet    terazosin (HYTRIN) 2 mg capsule        Patient due for colonoscopy but currently does not have insurance  I filled out a health network lab form for him in the office today to take to get his labs done due to not having insurance- I ordered him to have a CMP, hemoglobin A1c, PSA and lipid panel    BMI Counseling: Body mass index is 25 47 kg/m²  Discussed the patient's BMI with him  The BMI is above normal  Nutrition recommendations include reducing portion sizes, decreasing overall calorie intake, 3-5 servings of fruits/vegetables daily, consuming healthier snacks, moderation in carbohydrate intake, increasing intake of lean protein and reducing intake of saturated fat and trans fat  Exercise recommendations include moderate aerobic physical activity for 150 minutes/week  M*Modal software was used to dictate this note  It may contain errors with dictating incorrect words or incorrect spelling  Please contact the provider directly with any questions  Subjective:      Patient ID: Marleny Clark is a 62 y o  male     RAND  Presents for routine follow-up and for refills of his medications  He did not have any recent lab work - last labs are from December 2019 at which time he did have an elevated fasting blood sugar but hemoglobin A1c was normal   His lipid panel from 2018 was normal with the exception of a slightly low HDL at 37  He is a  and tells me that it is difficult for him to eat healthy meals especially during the pandemic  He has been compliant with his medications  He is currently on lisinopril 40 mg daily for high blood pressure  Blood pressure is well controlled  He denies any headache or vision changes  No chest pain or swelling in his ankles  He is on simvastatin 20 mg q h s  For his cholesterol  Last lipid panel from 2018 at goal     He has a history of enlarged prostate  Last PSA from December 2019 was normal   Currently on terazosin 4 mg q h s         The following portions of the patient's history were reviewed and updated as appropriate: allergies, current medications, past family history, past medical history, past social history, past surgical history and problem list     Review of Systems   Constitutional: Negative for chills and fever  Eyes: Negative for visual disturbance  Respiratory: Positive for cough  Negative for chest tightness, shortness of breath and wheezing  Cardiovascular: Negative for chest pain, palpitations and leg swelling  Gastrointestinal: Negative for blood in stool, constipation and diarrhea  Neurological: Negative for headaches           Past Medical History:   Diagnosis Date    Basal cell carcinoma of nose 5/22/2017    Hypertension        Current Outpatient Medications:     aspirin (ECOTRIN LOW STRENGTH) 81 mg EC tablet, Take 81 mg by mouth daily, Disp: , Rfl:     lisinopril (ZESTRIL) 40 mg tablet, Take 1 tablet (40 mg total) by mouth daily, Disp: 90 tablet, Rfl: 1    Multiple Vitamin (multivitamin) capsule, Take 1 capsule by mouth daily, Disp: , Rfl:     simvastatin (ZOCOR) 20 mg tablet, Take 1 tablet (20 mg total) by mouth daily, Disp: 90 tablet, Rfl: 1    terazosin (HYTRIN) 2 mg capsule, Take 2 capsules (4 mg total) by mouth daily at bedtime, Disp: 180 capsule, Rfl: 1    No Known Allergies    Social History   Past Surgical History:   Procedure Laterality Date    COLON SURGERY      TONSILLECTOMY      WISDOM TOOTH EXTRACTION       Family History   Problem Relation Age of Onset    Hyperlipidemia Mother     No Known Problems Father        Objective:  /64 (BP Location: Left arm, Patient Position: Sitting, Cuff Size: Standard)   Pulse 80   Temp 99 °F (37 2 °C) (Temporal)   Ht 5' 8 7" (1 745 m) Comment: shoes on  Wt 77 6 kg (171 lb) Comment: shoes on  SpO2 98%   BMI 25 47 kg/m²      Physical Exam  Vitals signs reviewed  Constitutional:       General: He is not in acute distress  Appearance: He is not diaphoretic  HENT:      Head: Normocephalic and atraumatic  Right Ear: Tympanic membrane and external ear normal       Left Ear: Tympanic membrane and external ear normal    Eyes:      Extraocular Movements: Extraocular movements intact  Conjunctiva/sclera: Conjunctivae normal       Pupils: Pupils are equal, round, and reactive to light  Cardiovascular:      Rate and Rhythm: Normal rate and regular rhythm  Heart sounds: Normal heart sounds  No murmur  Pulmonary:      Effort: Pulmonary effort is normal  No respiratory distress  Breath sounds: Normal breath sounds  No wheezing, rhonchi or rales  Musculoskeletal:      Right lower leg: No edema  Left lower leg: No edema  Skin:     General: Skin is warm and dry  Neurological:      Mental Status: He is alert  Mental status is at baseline     Psychiatric:         Mood and Affect: Mood normal          Behavior: Behavior normal

## 2020-09-15 NOTE — ASSESSMENT & PLAN NOTE
Improved diet  Monitor hemoglobin A1c - repeat prior to follow-up  Monitor fasting blood sugar with routine labs

## 2020-09-15 NOTE — PATIENT INSTRUCTIONS
Continue same medications   Due for colonoscopy when you get insurance  Go for labs in December  Follow up in 3-4 months

## 2021-03-17 DIAGNOSIS — I10 ESSENTIAL HYPERTENSION, BENIGN: ICD-10-CM

## 2021-03-17 RX ORDER — LISINOPRIL 40 MG/1
TABLET ORAL
Qty: 90 TABLET | Refills: 1 | OUTPATIENT
Start: 2021-03-17